# Patient Record
Sex: FEMALE | Race: WHITE
[De-identification: names, ages, dates, MRNs, and addresses within clinical notes are randomized per-mention and may not be internally consistent; named-entity substitution may affect disease eponyms.]

---

## 2018-04-03 ENCOUNTER — HOSPITAL ENCOUNTER (OUTPATIENT)
Dept: HOSPITAL 45 - C.CTS | Age: 83
Discharge: HOME | End: 2018-04-03
Attending: FAMILY MEDICINE
Payer: COMMERCIAL

## 2018-04-03 DIAGNOSIS — G45.9: Primary | ICD-10-CM

## 2018-04-03 NOTE — DIAGNOSTIC IMAGING REPORT
CT HEAD WITHOUT CONTRAST (CT)



CLINICAL HISTORY: Transient ischemic attack    



COMPARISON STUDY:  MRI the brain dated 5/5/2015



TECHNIQUE:  Axial CT of the brain is performed from the vertex to the skull

base. IV contrast was not administered for this examination. A dose lowering

technique was utilized adhering to the principles of ALARA.

 



CT DOSE: 788.63 mGycm



FINDINGS:



No intra or extra-axial mass lesions are visualized. There is no CT evidence of

acute cortical infarction. There is no evidence of midline shift. There is no

acute  hemorrhage. No calvarial fractures are visualized. 

There are patchy white matter hypodensities likely on a small vessel basis.

There is no evidence of pathologic ventricular dilatation.

There is no evidence of acute sinusitis. There is a partially calcified left

anterior occipital scalp nodule likely representing a sebaceous cyst.



IMPRESSION: No acute intracranial findings







Electronically signed by:  Amandeep Montiel M.D.

4/3/2018 2:44 PM



Dictated Date/Time:  4/3/2018 2:43 PM

## 2018-04-18 ENCOUNTER — HOSPITAL ENCOUNTER (EMERGENCY)
Dept: HOSPITAL 45 - C.EDA | Age: 83
Discharge: HOME | End: 2018-04-18
Payer: COMMERCIAL

## 2018-04-18 VITALS
HEIGHT: 67.01 IN | WEIGHT: 167.33 LBS | WEIGHT: 167.33 LBS | HEIGHT: 67.01 IN | BODY MASS INDEX: 26.26 KG/M2 | BODY MASS INDEX: 26.26 KG/M2

## 2018-04-18 VITALS — DIASTOLIC BLOOD PRESSURE: 72 MMHG | SYSTOLIC BLOOD PRESSURE: 140 MMHG | HEART RATE: 80 BPM | OXYGEN SATURATION: 96 %

## 2018-04-18 VITALS — TEMPERATURE: 97.7 F

## 2018-04-18 DIAGNOSIS — Z79.899: ICD-10-CM

## 2018-04-18 DIAGNOSIS — Z79.82: ICD-10-CM

## 2018-04-18 DIAGNOSIS — F80.1: Primary | ICD-10-CM

## 2018-04-18 DIAGNOSIS — Z88.5: ICD-10-CM

## 2018-04-18 LAB
ALBUMIN SERPL-MCNC: 3.7 GM/DL (ref 3.4–5)
ALP SERPL-CCNC: 94 U/L (ref 45–117)
ALT SERPL-CCNC: 37 U/L (ref 12–78)
AST SERPL-CCNC: 31 U/L (ref 15–37)
BASOPHILS # BLD: 0.02 K/UL (ref 0–0.2)
BASOPHILS NFR BLD: 0.3 %
BUN SERPL-MCNC: 27 MG/DL (ref 7–18)
CALCIUM SERPL-MCNC: 9.3 MG/DL (ref 8.5–10.1)
CO2 SERPL-SCNC: 24 MMOL/L (ref 21–32)
CREAT SERPL-MCNC: 1 MG/DL (ref 0.6–1.2)
EOS ABS #: 0.17 K/UL (ref 0–0.5)
EOSINOPHIL NFR BLD AUTO: 200 K/UL (ref 130–400)
GLUCOSE SERPL-MCNC: 176 MG/DL (ref 70–99)
HCT VFR BLD CALC: 37.3 % (ref 37–47)
HGB BLD-MCNC: 12.8 G/DL (ref 12–16)
IG#: 0.01 K/UL (ref 0–0.02)
IMM GRANULOCYTES NFR BLD AUTO: 21.8 %
LIPASE: 153 U/L (ref 73–393)
LYMPHOCYTES # BLD: 1.63 K/UL (ref 1.2–3.4)
MCH RBC QN AUTO: 30.9 PG (ref 25–34)
MCHC RBC AUTO-ENTMCNC: 34.3 G/DL (ref 32–36)
MCV RBC AUTO: 90.1 FL (ref 80–100)
MONO ABS #: 0.59 K/UL (ref 0.11–0.59)
MONOCYTES NFR BLD: 7.9 %
NEUT ABS #: 5.07 K/UL (ref 1.4–6.5)
NEUTROPHILS # BLD AUTO: 2.3 %
NEUTROPHILS NFR BLD AUTO: 67.6 %
PMV BLD AUTO: 8.9 FL (ref 7.4–10.4)
POTASSIUM SERPL-SCNC: 4 MMOL/L (ref 3.5–5.1)
PROT SERPL-MCNC: 7.5 GM/DL (ref 6.4–8.2)
RED CELL DISTRIBUTION WIDTH CV: 13.6 % (ref 11.5–14.5)
RED CELL DISTRIBUTION WIDTH SD: 44.5 FL (ref 36.4–46.3)
SODIUM SERPL-SCNC: 137 MMOL/L (ref 136–145)
WBC # BLD AUTO: 7.49 K/UL (ref 4.8–10.8)

## 2018-04-18 NOTE — DISCHARGE INSTRUCTIONS
Discharge Instructions


Date of Service


Apr 18, 2018.





Admission


Reason for Admission:  Confusion





Discharge


Discharge Diagnosis / Problem:  Transient Ischemic Attack/TIA or Ministroke





Discharge Goals


Goal(s):  Decrease discomfort, Improve function, Increase independence





Activity Recommendations


Activity Limitations:  resume your previous activity





.





Instructions / Follow-Up


Instructions / Follow-Up


Transient Ischemic Attack or Mini Stroke:


- Thankfully your imaging does not show a stroke on imaging of your brain. 

Giving the short duration of your symptoms it appears you had a TIA. This is 

when a temporary clot or loss of blood flow occurs and gives you neurological 

symptoms however your body dissolves this on its own and does not cause a 

stroke.


- However, you will be at risk for more TIAs or even a stroke but this is an 

element of the unknown and can occur at any time.


- Your vessels in your neck and lower part of your head/upper portion of your 

neck show some plaque which likely is the source of your mini strokes. The best 

approach is medical management. Such as using aspirin and cholesterol lower 

medications. Most do not recommend to stent this area due to risk of breaking 

more of the clot off but it is important to know that there plaque in these 

vessels


- Will coordinate with your family doctor to get a follow up appointment and an 

echocardiogram


- The best method is to optimize the medications you are on to help prevent 

mini strokes and strokes. Your family doctor has already started this.


   -- Take your higher dose aspirin at 325 mg daily. Will discuss with your 

family doctor and could consider transitioning to Plavix as an option.


   -- Will increase your Atorvastatin to 20 mg at bedtime. Watch for any muscle 

aches. This medication may be beneficial to continue to increase if you 

tolerate this well.


   -- Keep your sugars under control. This will help reduce your stroke risk.


   -- Keep your blood pressure under control. Recommend checking this when you 

are at the store such as [employer]. A lot of places have the machines near the 

pharmacy


      -- Could also purchase a small automatic one to check at home





PLEASE SEE THE INFORMATION BELOW. IF YOU DEVELOP ANY OF THESE SYMPTOMS CALL 911





FAST is an acronym used as a mnemonic to help detect and enhance responsiveness 

to stroke victim needs. The acronym stands for Facial drooping, Arm weakness, 

Speech difficulties and Time to call emergency services. Facial drooping: A 

section of the face, usually only on one side, that is drooping and hard to 

move.





Risk Factors for Stroke:





You can reduce your chances of stroke by working with your medical provider to 

adopt a healthy lifestyle.  Some specific ways to lower your chance of stroke 

are:





* If you are a smoker, now is the time to stop smoking cigarettes


* If you are diabetic, improve the control of your blood sugars


* Avoid excessive amounts of alcohol


* Control high blood pressure


* Lose weight if you are overweight


* Be sure to lead an active lifestyle


* Eat a healthy diet low in salt, cholesterol and fat





You should know about other risk factors for stroke that you are unable to 

control.  These include:





* Age 55 years or older


* Male gender


* Certain racial groups: ,  or /


* Family History of Stroke, Mini stroke or Heart Attack


* Sickle Cell Disease





Follow Up:





It is important for you to keep your follow up appointments with your medical 

provider.





Current Hospital Diet


Patient's current hospital diet: Diabetes Type 2 Diet





Discharge Diet


Recommended Diet:  Low Sodium Diet (2gm Na)





Pending Studies


Studies pending at discharge:  no





Medical Emergencies








.


Who to Call and When:





Medical Emergencies:  Call 911 immediately if you experience any of the 

following warning signs and symptoms of Stroke:





* Sudden numbness or weakness of the face, arm or leg, especially on one side 

of the body


* Sudden confusion, trouble speaking or understanding


* Sudden trouble seeing in one or both eyes


* Sudden trouble walking, dizziness, loss of balance or coordination


* Sudden severe headache with no cause





Do not delay calling 911 if you experience any warning signs or symptoms of a 

stroke.





Delay in seeking medical attention may affect what treatments can be given to 

you.








.





Non-Emergent Contact


Non-Emergency issues call your:  Primary Care Provider


Call Non-Emergent contact if:  you have a fever, your pain is concerning you, 

you have any medication questions





.


.








"Provider Documentation" section prepared by Marci Boone.








.





Stroke Core Measures


Reason no t-PA for Stroke:  Treatment not indicated


Reason no antithrom by day 2:  Treatment provided - N/A


Reason no antithrom at D/C:  Treatment provided - N/A


Reason no statin at D/C:  Treatment provided - N/A


Reason no anticoag w/a fib:  Treatment not indicated

## 2018-04-18 NOTE — DIAGNOSTIC IMAGING REPORT
CHEST ONE VIEW PORTABLE



CLINICAL HISTORY: ABDOMINAL PAIN/GI dyspnea



COMPARISON STUDY:  11/16/2016



FINDINGS: The bones soft tissues and hemidiaphragms are normal. The

cardiomediastinal silhouette is normal. The lungs are clear. The pulmonary

vasculature is normal. 



IMPRESSION:  Negative chest. 











The above report was generated using voice recognition software.  It may contain

grammatical, syntax or spelling errors.









Electronically signed by:  Nate Dow M.D.

4/18/2018 10:34 AM



Dictated Date/Time:  4/18/2018 10:33 AM

## 2018-04-18 NOTE — DIAGNOSTIC IMAGING REPORT
NECK ANGIO WITH CONTRAST, ANGIOGRAPHY HEAD COMBO



CLINICAL HISTORY:   83 years-old Female presents with acute strokelike symptoms



COMPARISON STUDY:  CT head 4/3/2018, brain MRI 4/27/2015



TECHNIQUE: Following the IV administration of 119 of Optiray 320, CT angiogram

of the head and neck was performed from the aortic arch to the skull base.

Images are reviewed in the axial, sagittal, and coronal planes. 3-D MIPS images

are created and assessed. IV contrast was administered without complication. All

measurements were calculated based on NASCET criteria.  A dose lowering

technique was utilized adhering to the principles of ALARA. Additionally,

noncontrast CT images were obtained.



FINDINGS:



CTA NECK:

Moderate mixed plaquing of the aortic arch. The imaged bilateral subclavian

arteries are widely patent. The bilateral common carotid arteries are widely

patent. Moderate mixed plaquing is seen involving the bilateral carotid bulbs,

right greater than left causing less than 50% luminal narrowing. Moderate

calcification is seen involving the cavernous, clinoid and super clinoid

portions of the internal carotid arteries bilaterally without high-grade

narrowing.



Moderate calcified plaque at the origin of the left vertebral artery causes

approximately 50% luminal narrowing. There is approximately 70% luminal

narrowing involving the left V2 segment of the vertebral artery at the level of

C5 secondary to prominent spondylitic spurring, nicely seen on image 251 series

6. There is approximately 50% luminal narrowing involving the V2 segment left

vertebral artery at the level of C6, also secondary to prominent spondylitic

spurring. Left vertebral artery is dominant. There is 90% luminal narrowing

involving the V2 segment right vertebral artery at the level of C5 as seen on

image 244 of series 6 secondary to mixture of spondylitic spurring and facet

arthrosis. Mild calcified plaque is seen distally to this area. Vertebral

arteries otherwise appear patent. Basilar artery appears widely patent.



No pneumothorax. Mild biapical pleural-parenchymal scarring. Heterogeneous

thyroid. Soft tissues are unremarkable. Orbits appear symmetric. No acute

intracranial abnormality. Degenerative changes of the sternoclavicular joints

with severe multilevel degenerative changes about the cervical spine. Large

posterior disc osteophyte complex is seen most prominently at the C3-C4 level.

Multilevel neuroforaminal narrowing. Mastoid air cells and paranasal sinuses

appear clear.



CTA HEAD:

Imaged bilateral internal carotid arteries are widely patent. The bilateral

middle and anterior cerebral arteries are widely patent. Anterior communicating

branch also appears normal.



Bilateral vertebral arteries are patent. The basilar artery and bilateral

posterior cerebral arteries are widely patent. The posterior communicating

branches appear unremarkable. Cerebral venous sinuses are patent and within

normal limits. No dissection, aneurysm or high-grade stenosis.



Noncontrast portion of the study demonstrates no acute intracranial hemorrhage,

midline shift, abnormal extra-axial collections, hydrocephalus or intracranial

mass. Moderate atrophy with chronic microvascular ischemic changes. No skull

fracture. Mastoid air cells are clear. Minimal mucoperiosteal thickening of the

ethmoid air cells and maxillary sinuses. Soft tissues and orbits are

unremarkable.





IMPRESSION:

1. Moderate calcified plaque at the origin of the left vertebral artery causes

approximately 50% luminal narrowing. Additionally, there is approximately 70%

luminal narrowing involving the left V2 segment of the vertebral artery at the

level of C5 secondary to prominent spondylitic spurring.



2. 50% luminal narrowing involving the V2 segment left vertebral artery at the

level of C6 with 90% luminal narrowing involving the V2 segment right vertebral

artery at the level of C5 secondary to mixture of spondylitic spurring and facet

arthrosis.



3. Moderate mixed plaquing of the bilateral carotid bulbs with less than 50%

luminal narrowing. 



4. No dissection, aneurysm or proximal branch occlusion.



5. No acute intracranial abnormality.





The above report was generated using voice recognition software. It may contain

grammatical, syntax or spelling errors.









Electronically signed by:  Pa Pkie M.D.

4/18/2018 12:27 PM



Dictated Date/Time:  4/18/2018 12:11 PM

## 2018-04-18 NOTE — DIAGNOSTIC IMAGING REPORT
NECK ANGIO WITH CONTRAST, ANGIOGRAPHY HEAD COMBO



CLINICAL HISTORY:   83 years-old Female presents with acute strokelike symptoms



COMPARISON STUDY:  CT head 4/3/2018, brain MRI 4/27/2015



TECHNIQUE: Following the IV administration of 119 of Optiray 320, CT angiogram

of the head and neck was performed from the aortic arch to the skull base.

Images are reviewed in the axial, sagittal, and coronal planes. 3-D MIPS images

are created and assessed. IV contrast was administered without complication. All

measurements were calculated based on NASCET criteria.  A dose lowering

technique was utilized adhering to the principles of ALARA. Additionally,

noncontrast CT images were obtained.



FINDINGS:



CTA NECK:

Moderate mixed plaquing of the aortic arch. The imaged bilateral subclavian

arteries are widely patent. The bilateral common carotid arteries are widely

patent. Moderate mixed plaquing is seen involving the bilateral carotid bulbs,

right greater than left causing less than 50% luminal narrowing. Moderate

calcification is seen involving the cavernous, clinoid and super clinoid

portions of the internal carotid arteries bilaterally without high-grade

narrowing.



Moderate calcified plaque at the origin of the left vertebral artery causes

approximately 50% luminal narrowing. There is approximately 70% luminal

narrowing involving the left V2 segment of the vertebral artery at the level of

C5 secondary to prominent spondylitic spurring, nicely seen on image 251 series

6. There is approximately 50% luminal narrowing involving the V2 segment left

vertebral artery at the level of C6, also secondary to prominent spondylitic

spurring. Left vertebral artery is dominant. There is 90% luminal narrowing

involving the V2 segment right vertebral artery at the level of C5 as seen on

image 244 of series 6 secondary to mixture of spondylitic spurring and facet

arthrosis. Mild calcified plaque is seen distally to this area. Vertebral

arteries otherwise appear patent. Basilar artery appears widely patent.



No pneumothorax. Mild biapical pleural-parenchymal scarring. Heterogeneous

thyroid. Soft tissues are unremarkable. Orbits appear symmetric. No acute

intracranial abnormality. Degenerative changes of the sternoclavicular joints

with severe multilevel degenerative changes about the cervical spine. Large

posterior disc osteophyte complex is seen most prominently at the C3-C4 level.

Multilevel neuroforaminal narrowing. Mastoid air cells and paranasal sinuses

appear clear.



CTA HEAD:

Imaged bilateral internal carotid arteries are widely patent. The bilateral

middle and anterior cerebral arteries are widely patent. Anterior communicating

branch also appears normal.



Bilateral vertebral arteries are patent. The basilar artery and bilateral

posterior cerebral arteries are widely patent. The posterior communicating

branches appear unremarkable. Cerebral venous sinuses are patent and within

normal limits. No dissection, aneurysm or high-grade stenosis.



Noncontrast portion of the study demonstrates no acute intracranial hemorrhage,

midline shift, abnormal extra-axial collections, hydrocephalus or intracranial

mass. Moderate atrophy with chronic microvascular ischemic changes. No skull

fracture. Mastoid air cells are clear. Minimal mucoperiosteal thickening of the

ethmoid air cells and maxillary sinuses. Soft tissues and orbits are

unremarkable.





IMPRESSION:

1. Moderate calcified plaque at the origin of the left vertebral artery causes

approximately 50% luminal narrowing. Additionally, there is approximately 70%

luminal narrowing involving the left V2 segment of the vertebral artery at the

level of C5 secondary to prominent spondylitic spurring.



2. 50% luminal narrowing involving the V2 segment left vertebral artery at the

level of C6 with 90% luminal narrowing involving the V2 segment right vertebral

artery at the level of C5 secondary to mixture of spondylitic spurring and facet

arthrosis.



3. Moderate mixed plaquing of the bilateral carotid bulbs with less than 50%

luminal narrowing. 



4. No dissection, aneurysm or proximal branch occlusion.



5. No acute intracranial abnormality.





The above report was generated using voice recognition software. It may contain

grammatical, syntax or spelling errors.









Electronically signed by:  Pa Pike M.D.

4/18/2018 12:27 PM



Dictated Date/Time:  4/18/2018 12:11 PM

## 2018-04-18 NOTE — DIAGNOSTIC IMAGING REPORT
MRI OF THE BRAIN WITHOUT  CONTRAST



CLINICAL HISTORY: Transitory aphasia



COMPARISON STUDY: 5/5/2015, CT scan dated 4/18/2018



FINDINGS:

Sagittal T1, axial diffusion, proton density and T2 weighted axial, coronal

FLAIR, and axial T1-weighted images were acquired. 

No intra or extra-axial mass lesions are visualized

Axial diffusion-weighted images reveal no evidence of acute or subacute

infarction.

There is no evidence of ventricular dilatation.

Proton density T2-weighted and FLAIR images reveal scattered foci of increased

T2 signal within the white matter, likely on a small vessel basis. The findings

remain unchanged the 2015 study

There are no abnormal flow voids.

There is a partially empty sella. Atrophic changes are evident.



IMPRESSION:  

1. No acute intracranial findings

2. No evidence of intracranial mass

3. No evidence of acute or subacute infarction







Electronically signed by:  Amandeep Montiel M.D.

4/18/2018 1:49 PM



Dictated Date/Time:  4/18/2018 1:46 PM

## 2018-04-18 NOTE — EMERGENCY ROOM VISIT NOTE
History


Report prepared by Catherine:  Damien Restrepo


Under the Supervision of:  Dr. Jimmy Drake M.D.


First contact with patient:  09:36


Chief Complaint:  OTHER COMPLAINT


Stated Complaint:  CONFUSION





History of Present Illness


The patient is an 83 year old female who presents to the Emergency Room with 

concerns over a sudden onset episode of aphasia that occurred yesterday at 1230

, 21 hours ago. The patient states that she was out to lunch with her friends, 

one of which has Alzheimer's and one who is deaf. She states that all of a 

sudden she noticed that she was not making sense with her speech. She states 

that "I could tell that whatever I was saying was not right." The patient adds 

that "I knew what I wanted to say but only garbled speech was coming out." 

There was no associated weakness with this episode and she was able to drive 

home normally after her aphasia resolved. She notes the episode lasted 10 

minutes or so before spontaneously resolving. She denies any other chest pain, 

shortness of breath, or dizziness. The patient called her PCP office today and 

told them about her symptoms from yesterday, and they instructed her to come to 

the ED. She feels normal today, and felt at her baseline two days ago. She 

added that she did have another episode a few weeks ago where she had "bright 

flashes" in her eyes. Her PCP is aware of this episode as well.





   Source of History:  patient


   Onset:  21 hours PTA


   Position:  head (Neurologic), other (Speech)


   Quality:  other (Aphasia)


   Timing:  resolved


   Associated Symptoms:  No chest pain, No SOB, No weakness





Review of Systems


See HPI for pertinent positives and negatives.  A total of ten systems were 

reviewed and were otherwise negative.





Past Medical & Surgical


Hx of Constipation diagnosis.





Family History


Omitted Secondary to age





Social History


Smoking Status:  Never Smoker


Occupation Status:  retired





Current/Historical Medications


Scheduled


Aspirin (Aspirin Ec), 325 MG PO DAILY


Atorvastatin (Lipitor), 20 MG PO DAILY


Cyanocobalamin (Vitamin B12), 1,000 MCG PO QAM


Insulin Aspart (Novolog Flexpen), 11 UNITS SQ QAM


Insulin Aspart (Novolog Flexpen), 12 SQ QPM


Levothyroxine Sodium (Levothyroxine Sodium), 125 MCG PO QAM


Lisinopril (Zestril), 5 MG PO DAILY


Multivitamin (Multivitamin), 1 TAB PO DAILY





Allergies


Coded Allergies:  


     Oxycodone (Verified  Adverse Reaction, Severe, SEVERE VOMITING/LIGHTHEADED

, 4/18/18)





Physical Exam


Vital Signs











  Date Time  Temp Pulse Resp B/P (MAP) Pulse Ox O2 Delivery O2 Flow Rate FiO2


 


4/18/18 15:29  80 16 140/72 96   


 


4/18/18 14:53  72 16  96 Room Air  


 


4/18/18 13:19  78      


 


4/18/18 12:22  67 16 152/88    


 


4/18/18 10:43  72 20 151/70 100   


 


4/18/18 09:23  84      


 


4/18/18 09:14 36.5 90 20 170/71 98 Room Air  











Physical Exam


GENERAL: Awake, alert, well-appearing, in no distress


HENT: Normocephalic, atraumatic. Oropharynx unremarkable.


EYES: Normal conjunctiva. Sclera non-icteric.


NECK: Supple. No nuchal rigidity. FROM. No JVD.


RESPIRATORY: Clear to auscultation.


CARDIAC: Regular rate, normal rhythm. Extremities warm and well perfused. 

Pulses equal.


ABDOMEN: Soft, non-distended. No tenderness to palpation. No rebound or 

guarding. No masses.


RECTAL: Deferred.


MUSCULOSKELETAL: Chest examination reveals no tenderness. The back is 

symmetrical on inspection without obvious abnormality. There is no CVA 

tenderness to palpation. No joint edema. 


LOWER EXTREMITIES: Calves are equal size bilaterally and non-tender. No edema. 

No discoloration. 


NEURO: Normal sensorium. No sensory or motor deficits noted. Normal cerebellar 

function with finger-to-nose, alternating palms, heel-to-shin


SKIN: No rash or jaundice noted





Medical Decision & Procedures


ER Provider


Diagnostic Interpretation:


Radiology results as stated below per my review and radiologist interpretation: 





NECK ANGIO WITH CONTRAST, ANGIOGRAPHY HEAD COMBO





CLINICAL HISTORY:   83 years-old Female presents with acute strokelike symptoms





COMPARISON STUDY:  CT head 4/3/2018, brain MRI 4/27/2015





TECHNIQUE: Following the IV administration of 119 of Optiray 320, CT angiogram


of the head and neck was performed from the aortic arch to the skull base.


Images are reviewed in the axial, sagittal, and coronal planes. 3-D MIPS images


are created and assessed. IV contrast was administered without complication. All


measurements were calculated based on NASCET criteria.  A dose lowering


technique was utilized adhering to the principles of ALARA. Additionally,


noncontrast CT images were obtained.





FINDINGS:





CTA NECK:


Moderate mixed plaquing of the aortic arch. The imaged bilateral subclavian


arteries are widely patent. The bilateral common carotid arteries are widely


patent. Moderate mixed plaquing is seen involving the bilateral carotid bulbs,


right greater than left causing less than 50% luminal narrowing. Moderate


calcification is seen involving the cavernous, clinoid and super clinoid


portions of the internal carotid arteries bilaterally without high-grade


narrowing.





Moderate calcified plaque at the origin of the left vertebral artery causes


approximately 50% luminal narrowing. There is approximately 70% luminal


narrowing involving the left V2 segment of the vertebral artery at the level of


C5 secondary to prominent spondylitic spurring, nicely seen on image 251 series


6. There is approximately 50% luminal narrowing involving the V2 segment left


vertebral artery at the level of C6, also secondary to prominent spondylitic


spurring. Left vertebral artery is dominant. There is 90% luminal narrowing


involving the V2 segment right vertebral artery at the level of C5 as seen on


image 244 of series 6 secondary to mixture of spondylitic spurring and facet


arthrosis. Mild calcified plaque is seen distally to this area. Vertebral


arteries otherwise appear patent. Basilar artery appears widely patent.





No pneumothorax. Mild biapical pleural-parenchymal scarring. Heterogeneous


thyroid. Soft tissues are unremarkable. Orbits appear symmetric. No acute


intracranial abnormality. Degenerative changes of the sternoclavicular joints


with severe multilevel degenerative changes about the cervical spine. Large


posterior disc osteophyte complex is seen most prominently at the C3-C4 level.


Multilevel neuroforaminal narrowing. Mastoid air cells and paranasal sinuses


appear clear.





CTA HEAD:


Imaged bilateral internal carotid arteries are widely patent. The bilateral


middle and anterior cerebral arteries are widely patent. Anterior communicating


branch also appears normal.





Bilateral vertebral arteries are patent. The basilar artery and bilateral


posterior cerebral arteries are widely patent. The posterior communicating


branches appear unremarkable. Cerebral venous sinuses are patent and within


normal limits. No dissection, aneurysm or high-grade stenosis.





Noncontrast portion of the study demonstrates no acute intracranial hemorrhage,


midline shift, abnormal extra-axial collections, hydrocephalus or intracranial


mass. Moderate atrophy with chronic microvascular ischemic changes. No skull


fracture. Mastoid air cells are clear. Minimal mucoperiosteal thickening of the


ethmoid air cells and maxillary sinuses. Soft tissues and orbits are


unremarkable.








IMPRESSION:


1. Moderate calcified plaque at the origin of the left vertebral artery causes


approximately 50% luminal narrowing. Additionally, there is approximately 70%


luminal narrowing involving the left V2 segment of the vertebral artery at the


level of C5 secondary to prominent spondylitic spurring.





2. 50% luminal narrowing involving the V2 segment left vertebral artery at the


level of C6 with 90% luminal narrowing involving the V2 segment right vertebral


artery at the level of C5 secondary to mixture of spondylitic spurring and facet


arthrosis.





3. Moderate mixed plaquing of the bilateral carotid bulbs with less than 50%


luminal narrowing. 





4. No dissection, aneurysm or proximal branch occlusion.





5. No acute intracranial abnormality.








The above report was generated using voice recognition software. It may contain


grammatical, syntax or spelling errors.














Electronically signed by:  Pa Pike M.D.


4/18/2018 12:27 PM





Dictated Date/Time:  4/18/2018 12:11 PM








CHEST ONE VIEW PORTABLE





CLINICAL HISTORY: ABDOMINAL PAIN/GI dyspnea





COMPARISON STUDY:  11/16/2016





FINDINGS: The bones soft tissues and hemidiaphragms are normal. The


cardiomediastinal silhouette is normal. The lungs are clear. The pulmonary


vasculature is normal. 





IMPRESSION:  Negative chest. 

















The above report was generated using voice recognition software.  It may contain


grammatical, syntax or spelling errors.














Electronically signed by:  Nate Dow M.D.


4/18/2018 10:34 AM





Dictated Date/Time:  4/18/2018 10:33 AM








NECK ANGIO WITH CONTRAST, ANGIOGRAPHY HEAD COMBO





CLINICAL HISTORY:   83 years-old Female presents with acute strokelike symptoms





COMPARISON STUDY:  CT head 4/3/2018, brain MRI 4/27/2015





TECHNIQUE: Following the IV administration of 119 of Optiray 320, CT angiogram


of the head and neck was performed from the aortic arch to the skull base.


Images are reviewed in the axial, sagittal, and coronal planes. 3-D MIPS images


are created and assessed. IV contrast was administered without complication. All


measurements were calculated based on NASCET criteria.  A dose lowering


technique was utilized adhering to the principles of ALARA. Additionally,


noncontrast CT images were obtained.





FINDINGS:





CTA NECK:


Moderate mixed plaquing of the aortic arch. The imaged bilateral subclavian


arteries are widely patent. The bilateral common carotid arteries are widely


patent. Moderate mixed plaquing is seen involving the bilateral carotid bulbs,


right greater than left causing less than 50% luminal narrowing. Moderate


calcification is seen involving the cavernous, clinoid and super clinoid


portions of the internal carotid arteries bilaterally without high-grade


narrowing.





Moderate calcified plaque at the origin of the left vertebral artery causes


approximately 50% luminal narrowing. There is approximately 70% luminal


narrowing involving the left V2 segment of the vertebral artery at the level of


C5 secondary to prominent spondylitic spurring, nicely seen on image 251 series


6. There is approximately 50% luminal narrowing involving the V2 segment left


vertebral artery at the level of C6, also secondary to prominent spondylitic


spurring. Left vertebral artery is dominant. There is 90% luminal narrowing


involving the V2 segment right vertebral artery at the level of C5 as seen on


image 244 of series 6 secondary to mixture of spondylitic spurring and facet


arthrosis. Mild calcified plaque is seen distally to this area. Vertebral


arteries otherwise appear patent. Basilar artery appears widely patent.





No pneumothorax. Mild biapical pleural-parenchymal scarring. Heterogeneous


thyroid. Soft tissues are unremarkable. Orbits appear symmetric. No acute


intracranial abnormality. Degenerative changes of the sternoclavicular joints


with severe multilevel degenerative changes about the cervical spine. Large


posterior disc osteophyte complex is seen most prominently at the C3-C4 level.


Multilevel neuroforaminal narrowing. Mastoid air cells and paranasal sinuses


appear clear.





CTA HEAD:


Imaged bilateral internal carotid arteries are widely patent. The bilateral


middle and anterior cerebral arteries are widely patent. Anterior communicating


branch also appears normal.





Bilateral vertebral arteries are patent. The basilar artery and bilateral


posterior cerebral arteries are widely patent. The posterior communicating


branches appear unremarkable. Cerebral venous sinuses are patent and within


normal limits. No dissection, aneurysm or high-grade stenosis.





Noncontrast portion of the study demonstrates no acute intracranial hemorrhage,


midline shift, abnormal extra-axial collections, hydrocephalus or intracranial


mass. Moderate atrophy with chronic microvascular ischemic changes. No skull


fracture. Mastoid air cells are clear. Minimal mucoperiosteal thickening of the


ethmoid air cells and maxillary sinuses. Soft tissues and orbits are


unremarkable.








IMPRESSION:


1. Moderate calcified plaque at the origin of the left vertebral artery causes


approximately 50% luminal narrowing. Additionally, there is approximately 70%


luminal narrowing involving the left V2 segment of the vertebral artery at the


level of C5 secondary to prominent spondylitic spurring.





2. 50% luminal narrowing involving the V2 segment left vertebral artery at the


level of C6 with 90% luminal narrowing involving the V2 segment right vertebral


artery at the level of C5 secondary to mixture of spondylitic spurring and facet


arthrosis.





3. Moderate mixed plaquing of the bilateral carotid bulbs with less than 50%


luminal narrowing. 





4. No dissection, aneurysm or proximal branch occlusion.





5. No acute intracranial abnormality.








The above report was generated using voice recognition software. It may contain


grammatical, syntax or spelling errors.














Electronically signed by:  Pa Pike M.D.


4/18/2018 12:27 PM





Dictated Date/Time:  4/18/2018 12:11 PM





Laboratory Results


4/18/18 10:20








Red Blood Count 4.14, Mean Corpuscular Volume 90.1, Mean Corpuscular Hemoglobin 

30.9, Mean Corpuscular Hemoglobin Concent 34.3, Mean Platelet Volume 8.9, 

Neutrophils (%) (Auto) 67.6, Lymphocytes (%) (Auto) 21.8, Monocytes (%) (Auto) 

7.9, Eosinophils (%) (Auto) 2.3, Basophils (%) (Auto) 0.3, Neutrophils # (Auto) 

5.07, Lymphocytes # (Auto) 1.63, Monocytes # (Auto) 0.59, Eosinophils # (Auto) 

0.17, Basophils # (Auto) 0.02





4/18/18 10:20

















Test


  4/18/18


10:20


 


White Blood Count


  7.49 K/uL


(4.8-10.8)


 


Red Blood Count


  4.14 M/uL


(4.2-5.4)


 


Hemoglobin


  12.8 g/dL


(12.0-16.0)


 


Hematocrit 37.3 % (37-47) 


 


Mean Corpuscular Volume


  90.1 fL


()


 


Mean Corpuscular Hemoglobin


  30.9 pg


(25-34)


 


Mean Corpuscular Hemoglobin


Concent 34.3 g/dl


(32-36)


 


Platelet Count


  200 K/uL


(130-400)


 


Mean Platelet Volume


  8.9 fL


(7.4-10.4)


 


Neutrophils (%) (Auto) 67.6 % 


 


Lymphocytes (%) (Auto) 21.8 % 


 


Monocytes (%) (Auto) 7.9 % 


 


Eosinophils (%) (Auto) 2.3 % 


 


Basophils (%) (Auto) 0.3 % 


 


Neutrophils # (Auto)


  5.07 K/uL


(1.4-6.5)


 


Lymphocytes # (Auto)


  1.63 K/uL


(1.2-3.4)


 


Monocytes # (Auto)


  0.59 K/uL


(0.11-0.59)


 


Eosinophils # (Auto)


  0.17 K/uL


(0-0.5)


 


Basophils # (Auto)


  0.02 K/uL


(0-0.2)


 


RDW Standard Deviation


  44.5 fL


(36.4-46.3)


 


RDW Coefficient of Variation


  13.6 %


(11.5-14.5)


 


Immature Granulocyte % (Auto) 0.1 % 


 


Immature Granulocyte # (Auto)


  0.01 K/uL


(0.00-0.02)


 


Anion Gap


  6.0 mmol/L


(3-11)


 


Est Creatinine Clear Calc


Drug Dose 45.3 ml/min 


 


 


Estimated GFR (


American) 60.3 


 


 


Estimated GFR (Non-


American 52.1 


 


 


BUN/Creatinine Ratio 27.3 (10-20) 


 


Calcium Level


  9.3 mg/dl


(8.5-10.1)


 


Total Bilirubin


  0.9 mg/dl


(0.2-1)


 


Direct Bilirubin


  0.3 mg/dl


(0-0.2)


 


Aspartate Amino Transf


(AST/SGOT) 31 U/L (15-37) 


 


 


Alanine Aminotransferase


(ALT/SGPT) 37 U/L (12-78) 


 


 


Alkaline Phosphatase


  94 U/L


()


 


Troponin I


  < 0.015 ng/ml


(0-0.045)


 


Total Protein


  7.5 gm/dl


(6.4-8.2)


 


Albumin


  3.7 gm/dl


(3.4-5.0)


 


Lipase


  153 U/L


()





Laboratory results reviewed by me





Medications Administered











 Medications


  (Trade)  Dose


 Ordered  Sig/Tejas


 Route  Start Time


 Stop Time Status Last Admin


Dose Admin


 


 Sodium Chloride  500 ml @ 


 999 mls/hr  Q31M STAT


 IV  4/18/18 09:39


 4/18/18 10:09 DC 4/18/18 10:38


999 MLS/HR











ECG Per My Interpretation


Indication:  other (Aphasia)


Rate (beats per minute):  81


Rhythm:  sinus rhythm


Findings:  PAC, other (LAD, No TERELL/STD)


Comparison ECG Date:  4/20/2009


Change:  no significant change





ED Course


0933: The patient was evaluated in room A11B. A complete history and physical 

exam was performed.





0939: Ordered Sodium Chloride 500 mL @ 999 mL/hr IV. 





1317: I discussed the case with Marci NORMAN Hospitalist STEPH. She will 

evaluate the patient for further treatment. 





1420: Dr. Noble NORMAN Hospitalist, who is the supervising physican of Marci Boone has informed me that he will NOT admit the patient. He does not believe 

she needs further care.





Medical Decision


I reviewed the patient's past medical history, medications, and the nursing 

notes as described above.





Differential diagnosis:


Etiologies such as metabolic, infection, hypo/hyperglycemia, electrolyte 

abnormalities, cardiac sources, intracerebral event, toxicologic, neurologic, 

as well as others were entertained. 





The patient is an 83-year-old woman who presents to the emergency department 

for evaluation after having symptoms yesterday of a 10 minute episode of 

aphasia where he was trying to speak and realized the words she was using did 

not correlate with her intended thought per hpi.  Today the patient realized 

she should probably see her doctor and when she called the office she was told 

to go to the emergency department.  On arrival the patient is relatively well-

appearing in no acute distress, afebrile stable vital signs.  She is 

neurologically intact includingnormal cerebellar function with finger-to-nose, 

alternating palms, heel-to-shin.  Labs unremarkable.  CT of the head and neck 

demonstrates vertebral artery stenosis which would not necessarily correlate 

with her symptoms and moreover unlikely to require intervention.  MRI of the 

brain was ordered and case was discussed with EMERSON Ball PAC as well 

as EMERSON Reveles hospitalist to evaluate the patient for possible admission 

for further CVA/TIA r/o. Upon completion of their evaluation MRI had been 

completed and was unremarkable.  Given the patient's imaging is otherwise 

unremarkable and her symptoms occurred approximately 24 hours ago, recommend 

close outpatient follow-up including an echocardiogram which is in the process 

of being arranged by her PCP. Findings and plan for follow-up reviewed with 

patient. Patient agreeable and d/c'd per discharge instructions.





Medication Reconcilliation


Current Medication List:  was personally reviewed by me





Blood Pressure Screening


Patient's blood pressure:  Elevated blood pressure


Referred to hospitalist.





Consults


Time Called:  1311


Consulting Physician:  Marci Paolo - [facility] Hospitalist PA-C


Returned Call:  1317


I discussed the case with Marci MEYERG Hospitalist STEPH. She will 

evaluate the patient for further treatment.





Impression





 Primary Impression:  


 Expressive aphasia





Scribe Attestation


The scribe's documentation has been prepared under my direction and personally 

reviewed by me in its entirety. I confirm that the note above accurately 

reflects all work, treatment, procedures, and medical decision making performed 

by me.





Departure Information


Dispostion


Home / Self-Care





Prescriptions





Atorvastatin (LIPITOR) 20 Mg Tab


20 MG PO DAILY for 30 Days, #30 TABS


   Prov: Marci Boone PA-C         4/18/18





Referrals


Asad Gonzales D.O. (PCP)





Patient Instructions


Aphasia, [facility], TIA





Additional Instructions





Please follow up with your primary care physician in the next 1-3 days for re-

evaluation and additional testing such as a formal echocardiogram.





The cause of your episode is unclear at this time and may have been due to a 

TIA. Your CT scan did show some arterial narrowing but unlikely to require 

intervention at this time.


Otherwise, your exam, EKG, chest xray, lab results, CT scan with contrast and 

MRI did not show signs of an emergent condition at this time.





Return to the emergency department for worsening symptoms as described in the 

accompanying instructions.

## 2018-04-18 NOTE — MEDICAL CONSULT
Consultation


Date of Consultation:


Apr 18, 2018.


Attending Physician:


Benjamin Dickey MD


Reason for Consultation:


Evaluation for Admission


History of Present Illness


Ms. Blanc is a 84 y/o female with PMHx of T2DM, HTN, HLD, and 

Hypothyroidism who presents for episode of aphasia on 4/17 at approximately 

1230. She was at lunch with her friends and states she knew what she wanted to 

say but her words did not make sense. States they were not garbled but they did 

not make sense in context. She states her friends did not notice because one is 

deaf and the other has Alzheimers. She states the speech was her only symptom 

but only lasted a couple minutes stating it wasn't even 10 minutes. She states 

when this resolved she was able to drive home without issue. She did call her 

PCP office today and was instructed to come to the ED. She did have some bright 

flashes in her eyes a couple weeks ago and was beginning a work-up with her 

PCP. She reports wearing a 24 hour holter which was unremarkable when 

discussing with Dr. Mariscal. Her ASA 81 mg was increased to 325 mg x 4 days ago. 

Her symptoms are completely resolved. Did have a long discussion about TIA and 

reducing stroke risk in the ED. Increased her Atorvastatin to 20 mg daily and 

could titrate further but will go slowly to reduce muscle pain. Did discuss 

optimizing blood glucose control and HTN. Written instructions given to her. 

Also discussed with Dr. Mariscal and arranged follow-up evaluation on 4/24. Could 

consider echocardiogram however not emergent. MRI did not reveal findings of a 

stroke. CTA neck/head did show multiple areas of the vertebral vessels with 

plaque which is likely the source of her TIAs. Given resolution of symptoms and 

outpatient work-up, patient would be optimal for D/C home with outpatient follow

-up.





Past Medical/Surgical History


1) HTN


2) HPL


3) DM II


4) Hypothyroidism





Family History





Diabetes mellitus


Hypertension





Social History


Smoking Status:  Never Smoker


Smokeless Tobacco Use:  No


Alcohol Use:  none


Drug Use:  none


Housing Status:  lives alone


Occupation Status:  retired





Allergies


Coded Allergies:  


     Oxycodone (Verified  Adverse Reaction, Severe, SEVERE VOMITING/LIGHTHEADED

, 4/18/18)





Current Inpatient Medications





Current Inpatient Medications








 Medications


  (Trade)  Dose


 Ordered  Sig/Tejas


 Route  Start Time


 Stop Time Status Last Admin


Dose Admin


 


 Ioversol


  (Optiray 320)  125 ml  UD  PRN


 IV  4/18/18 09:45


 4/22/18 09:44   


 











Review of Systems


Constitutional:  No fever, No chills, No weakness


Eyes:  No worsening of vision


ENT:  No hearing loss, No nasal symptoms, No sore throat, No trouble swallowing


Respiratory:  No cough, No shortness of breath


Cardiovascular:  No chest pain, No palpitations


Abdomen:  No pain, No nausea, No vomiting, No diarrhea, No constipation


Musculoskeletal:  No swelling, No calf pain


Genitourinary - Female:  No dysuria


Neurologic:  + balance problems (chronic related to b/l hip replacements), + 

problem reported (aphasia on 4/17), No weakness, No numbness/tingling, No 

vertigo


Hematologic / Lymphatic:  No abnormal bleeding/bruising


Integumentary:  No rash





Physical Exam











  Date Time  Temp Pulse Resp B/P (MAP) Pulse Ox O2 Delivery O2 Flow Rate FiO2


 


4/18/18 15:29  80 16 140/72 96   


 


4/18/18 14:53  72 16  96 Room Air  


 


4/18/18 13:19  78      


 


4/18/18 12:22  67 16 152/88    


 


4/18/18 10:43  72 20 151/70 100   


 


4/18/18 09:23  84      


 


4/18/18 09:14 36.5 90 20 170/71 98 Room Air  








General Appearance:  WD/WN, no apparent distress


Head:  normocephalic, atraumatic


Eyes:  sclerae normal


ENT:  hearing grossly normal


Neck:  supple, no JVD, trachea midline, + pertinent finding (no auscultated 

carotid bruits b/l)


Respiratory/Chest:  lungs clear, normal breath sounds, no respiratory distress, 

no accessory muscle use


Cardiovascular:  regular rate, rhythm, no gallop, no murmur


Abdomen/GI:  normal bowel sounds, non tender, soft


Back:  normal inspection


Extremities/Musculoskelatal:  no calf tenderness, no pedal edema


Neurologic/Psych:  no motor/sensory deficits, alert, normal mood/affect, 

oriented x 3


Skin:  normal color, warm/dry





Laboratory Results





Last 24 Hours








Test


  4/18/18


10:20


 


White Blood Count 7.49 K/uL 


 


Red Blood Count 4.14 M/uL 


 


Hemoglobin 12.8 g/dL 


 


Hematocrit 37.3 % 


 


Mean Corpuscular Volume 90.1 fL 


 


Mean Corpuscular Hemoglobin 30.9 pg 


 


Mean Corpuscular Hemoglobin


Concent 34.3 g/dl 


 


 


Platelet Count 200 K/uL 


 


Mean Platelet Volume 8.9 fL 


 


Neutrophils (%) (Auto) 67.6 % 


 


Lymphocytes (%) (Auto) 21.8 % 


 


Monocytes (%) (Auto) 7.9 % 


 


Eosinophils (%) (Auto) 2.3 % 


 


Basophils (%) (Auto) 0.3 % 


 


Neutrophils # (Auto) 5.07 K/uL 


 


Lymphocytes # (Auto) 1.63 K/uL 


 


Monocytes # (Auto) 0.59 K/uL 


 


Eosinophils # (Auto) 0.17 K/uL 


 


Basophils # (Auto) 0.02 K/uL 


 


RDW Standard Deviation 44.5 fL 


 


RDW Coefficient of Variation 13.6 % 


 


Immature Granulocyte % (Auto) 0.1 % 


 


Immature Granulocyte # (Auto) 0.01 K/uL 


 


Sodium Level 137 mmol/L 


 


Potassium Level 4.0 mmol/L 


 


Chloride Level 107 mmol/L 


 


Carbon Dioxide Level 24 mmol/L 


 


Anion Gap 6.0 mmol/L 


 


Blood Urea Nitrogen 27 mg/dl 


 


Creatinine 1.00 mg/dl 


 


Est Creatinine Clear Calc


Drug Dose 45.3 ml/min 


 


 


Estimated GFR (


American) 60.3 


 


 


Estimated GFR (Non-


American 52.1 


 


 


BUN/Creatinine Ratio 27.3 


 


Random Glucose 176 mg/dl 


 


Calcium Level 9.3 mg/dl 


 


Total Bilirubin 0.9 mg/dl 


 


Direct Bilirubin 0.3 mg/dl 


 


Aspartate Amino Transf


(AST/SGOT) 31 U/L 


 


 


Alanine Aminotransferase


(ALT/SGPT) 37 U/L 


 


 


Alkaline Phosphatase 94 U/L 


 


Troponin I < 0.015 ng/ml 


 


Total Protein 7.5 gm/dl 


 


Albumin 3.7 gm/dl 


 


Lipase 153 U/L 











Assessment & Plan


Ms. Blanc is a 84 y/o female with PMHx of T2DM, HTN, HLD, and 

Hypothyroidism who presents for episode of aphasia on 4/17 at approximately 

1230. She was at lunch with her friends and states she knew what she wanted to 

say but her words did not make sense





TIA:


- She has had 2 possible TIAs and does have multiple risk factors that place 

her at risk for further TIAs or a stroke. This was discussed with her and given 

in written instructions


- No CVA findings on MRI and complete resolution of aphasia without any focal 

neurological deficits. She does have chronic walking issues related to uneven 

legs after hip surgeries but no changes with this and ambulates with a cane or 

walker


- She does have plaque mostly in L vertebral arteries which is likely the 

source of TIA. Surgical option is available however research supporting against 

surgical intervention related to risk. Discussed with Dr. Mariscal and could 

consider Plavix addition.


- Sent an Rx for Atorvastatin 20 mg daily and likely can increase further to 

optimize risk factors. Will go slowly to prevent muscle pains/adverse effect


- Four days ago her ASA 81 mg was increased to 325 mg daily and will continue 

this. Could also consider transition to Plavix.


- She had a holter monitor just a few days ago that was unremarkable.  Could 

consider routine echocardiogram as well.


- Recommended to monitor BSGs and HTN to optimize risk.


- A F/U appt with PCP established for 4/24


- Instructed patient and friend on signs to monitor for and to call 911 for any 

concerns for stroke like symptoms


- Patient is optimal for D/C home with close outpatient follow-up. Given she 

had her heart rhythm evaluated which lowers the risk this is arrhythmia related/

embolic with the known vertebral plaques she would be reasonable to manage as 

an outpatient with optimization of medical management with antiplatelet therapy

, statins, continued DM control, and HTN control. 








Resident Physician Supervision Note:





I was present with Marci MOYA during the history and exam. I discussed 

the case with the resident and agree with the findings and plan as documented 

in the note.  Any exceptions or clarifications are listed here: 





84 y/o F Hx HTN, HPL, DM II





Presented to the ER approximately 24 hours following an episode of aphasia.  

She has had previous such episodes and is being evaluated in the outpt setting.

  She did not have symptoms at the time of arrival in the ER.  





OE


AAO x 3


S1,2 R


CTAB


NT, ND


No CCE





P:


A CTA head/neck was negative


She will receive ASA, possibly Plavix and we have recommended increasing her 

Statin dose


Following discussion with her primary MD and the ER attending, the pt will be 

DCd by her own request.  She has immediate follow up for an echo and possibly 

carotid US upon DC.  





Documented By:  Benjamin Dickey





Additional Copies To


Asad Gonzales D.O.; Mathew Mariscal MD